# Patient Record
Sex: FEMALE | Race: BLACK OR AFRICAN AMERICAN | NOT HISPANIC OR LATINO | Employment: UNEMPLOYED | URBAN - METROPOLITAN AREA
[De-identification: names, ages, dates, MRNs, and addresses within clinical notes are randomized per-mention and may not be internally consistent; named-entity substitution may affect disease eponyms.]

---

## 2018-10-15 ENCOUNTER — OFFICE VISIT (OUTPATIENT)
Dept: FAMILY MEDICINE | Facility: CLINIC | Age: 28
End: 2018-10-15
Payer: COMMERCIAL

## 2018-10-15 VITALS
WEIGHT: 164 LBS | OXYGEN SATURATION: 99 % | SYSTOLIC BLOOD PRESSURE: 98 MMHG | HEART RATE: 78 BPM | BODY MASS INDEX: 26.36 KG/M2 | HEIGHT: 66 IN | DIASTOLIC BLOOD PRESSURE: 66 MMHG | TEMPERATURE: 98.1 F

## 2018-10-15 DIAGNOSIS — R10.13 ABDOMINAL PAIN, EPIGASTRIC: ICD-10-CM

## 2018-10-15 DIAGNOSIS — K59.00 CONSTIPATION, UNSPECIFIED CONSTIPATION TYPE: Primary | ICD-10-CM

## 2018-10-15 DIAGNOSIS — R14.0 BLOATING SYMPTOM: ICD-10-CM

## 2018-10-15 PROCEDURE — 99203 OFFICE O/P NEW LOW 30 MIN: CPT | Performed by: NURSE PRACTITIONER

## 2018-10-15 RX ORDER — POLYETHYLENE GLYCOL 3350 17 G/17G
1 POWDER, FOR SOLUTION ORAL DAILY
Qty: 510 G | Refills: 1 | Status: SHIPPED | OUTPATIENT
Start: 2018-10-15

## 2018-10-15 NOTE — MR AVS SNAPSHOT
After Visit Summary   10/15/2018    Clemencia Marquez    MRN: [id number]           Patient Information     Date Of Birth          1990        Visit Information        Provider Department      10/15/2018 2:00 PM Esperanza Fuentes APRN [facility]        Today's Diagnoses     Constipation, unspecified constipation type    -  1    Bloating symptom        Abdominal pain, epigastric          Care Instructions      Treating Constipation    Constipation is a common and often uncomfortable problem. Constipation means you have bowel movements fewer than 3 times per week, or strain to pass hard, dry stool. It can last a short time. Or it can be a problem that never seems to go away. The good news is that it can often be treated and controlled.  Eat more fiber  One of the best ways to help treat constipation is to increase your fiber intake. You can do this either through diet or by using fiber supplements. Fiber (in whole grains, fruits, and vegetables) adds bulk and absorbs water to soften the stool. This helps the stool pass through the colon more easily. When you increase your fiber intake, do it slowly to avoid side effects such as bloating. Also increase the amount of water that you drink. Eating more of the following foods can add fiber to your diet.    High-fiber cereals    Whole grains, bran, and brown rice    Vegetables such as carrots, broccoli, and greens    Fresh fruits (especially apples, pears, and dried fruits like raisins and apricots)    Nuts and legumes (especially beans such as lentils, kidney beans, and lima beans)  Get physically active  Exercise helps improve the working of your colon which helps ease constipation. Try to get some physical activity every day. If you haven t been active for a while, talk to your healthcare provider before starting again.  Laxatives  Your healthcare provider may suggest an over-the-counter product to help ease your constipation. He or she may  suggest the use of bulk-forming agents or laxatives. The use of laxatives, if used as directed, is common and safe. Follow directions carefully when using them. See your healthcare provider for new-onset constipation, or long-term constipation, to rule out other causes such as medicines or thyroid disease.  Date Last Reviewed: 7/1/2016 2000-2017 [employer]. [address] [place] [address]. All rights reserved. This information is not intended as a substitute for professional medical care. Always follow your healthcare professional's instructions.      Clemencia was seen today for abdominal pain.    Diagnoses and all orders for this visit:    Constipation, unspecified constipation type  -     polyethylene glycol (MIRALAX) powder; Take 17 g (1 capful) by mouth daily  -     Increase water intake and fruits/vegetables.  Goal:  5 fruits and vegetables every day.     Abdominal pain, epigastric  Bloating symptom  -     H Pylori antigen, stool; Future - check for bacteria in stomach.       Follow-up in 1 weeks.                   Follow-ups after your visit        Follow-up notes from your care team     Return in about 1 week (around 10/22/2018).      Future tests that were ordered for you today     Open Future Orders        Priority Expected Expires Ordered    H Pylori antigen, stool Routine  11/14/2018 10/15/2018            Who to contact     If you have questions or need follow up information about today's clinic visit or your schedule please contact [facility] [place] directly at [phone].  Normal or non-critical lab and imaging results will be communicated to you by MyChart, letter or phone within 4 business days after the clinic has received the results. If you do not hear from us within 7 days, please contact the clinic through MyChart or phone. If you have a critical or abnormal lab result, we will notify you by phone as soon as possible.  Submit refill requests through [employer] or  "call your pharmacy and they will forward the refill request to us. Please allow 3 business days for your refill to be completed.          Additional Information About Your Visit        MyChart Information     A.P.Pharmahart lets you send messages to your doctor, view your test results, renew your prescriptions, schedule appointments and more. To sign up, go to [email or web address] . Click on \"Log in\" on the left side of the screen, which will take you to the Welcome page. Then click on \"Sign up Now\" on the right side of the page.     You will be asked to enter the access code listed below, as well as some personal information. Please follow the directions to create your username and password.     Your access code is: [email or web address]  Expires: 2019  2:48 PM     Your access code will  in 90 days. If you need help or a new code, please call your [place] clinic or [phone].        Care EveryWhere ID     This is your Care EveryWhere ID. This could be used by other organizations to access your [place] medical records  [id number]        Your Vitals Were     Pulse Temperature Height Last Period Pulse Oximetry BMI (Body Mass Index)    78 98.1  F (36.7  C) (Oral) 5' 5.5\" (1.664 m) 2018 99% 26.88 kg/m2       Blood Pressure from Last 3 Encounters:   10/15/18 98/66    Weight from Last 3 Encounters:   10/15/18 164 lb (74.4 kg)                 Today's Medication Changes          These changes are accurate as of 10/15/18  2:48 PM.  If you have any questions, ask your nurse or doctor.               Start taking these medicines.        Dose/Directions    polyethylene glycol powder   Commonly known as:  MIRALAX   Used for:  Constipation, unspecified constipation type   Started by:  Esperanza Fuentes APRN CNP        Dose:  1 capful   Take 17 g (1 capful) by mouth daily   Quantity:  510 g   Refills:  1            Where to get your medicines      These medications were sent to [facility] [place], [place] - " [address] [facility] [address]  [address], [place] [address]     Phone:  [phone]     polyethylene glycol powder                Primary Care Provider Fax #    Physician No Ref-Primary [phone]       No address on file        Equal Access to Services     [facility]: Hadii aad ku hadasho Soomaali, waaxda luqadaha, qaybta kaalmada adeegyada, waxay idiin hayaan adeavery chikadanielle laasha . So [facility] [phone].    ATENCIÓN: Si habla español, tiene a alva disposición servicios gratuitos de asistencia lingüística. Sridhar al [phone].    We comply with applicable federal civil rights laws and [place] laws. We do not discriminate on the basis of race, color, national origin, age, disability, sex, sexual orientation, or gender identity.            Thank you!     Thank you for choosing [facility]  for your care. Our goal is always to provide you with excellent care. Hearing back from our patients is one way we can continue to improve our services. Please take a few minutes to complete the written survey that you may receive in the mail after your visit with us. Thank you!             Your Updated Medication List - Protect others around you: Learn how to safely use, store and throw away your medicines at www.disposemymeds.org.          This list is accurate as of 10/15/18  2:48 PM.  Always use your most recent med list.                   Brand Name Dispense Instructions for use Diagnosis    polyethylene glycol powder    MIRALAX    510 g    Take 17 g (1 capful) by mouth daily    Constipation, unspecified constipation type

## 2018-10-15 NOTE — PATIENT INSTRUCTIONS
Treating Constipation    Constipation is a common and often uncomfortable problem. Constipation means you have bowel movements fewer than 3 times per week, or strain to pass hard, dry stool. It can last a short time. Or it can be a problem that never seems to go away. The good news is that it can often be treated and controlled.  Eat more fiber  One of the best ways to help treat constipation is to increase your fiber intake. You can do this either through diet or by using fiber supplements. Fiber (in whole grains, fruits, and vegetables) adds bulk and absorbs water to soften the stool. This helps the stool pass through the colon more easily. When you increase your fiber intake, do it slowly to avoid side effects such as bloating. Also increase the amount of water that you drink. Eating more of the following foods can add fiber to your diet.    High-fiber cereals    Whole grains, bran, and brown rice    Vegetables such as carrots, broccoli, and greens    Fresh fruits (especially apples, pears, and dried fruits like raisins and apricots)    Nuts and legumes (especially beans such as lentils, kidney beans, and lima beans)  Get physically active  Exercise helps improve the working of your colon which helps ease constipation. Try to get some physical activity every day. If you haven t been active for a while, talk to your healthcare provider before starting again.  Laxatives  Your healthcare provider may suggest an over-the-counter product to help ease your constipation. He or she may suggest the use of bulk-forming agents or laxatives. The use of laxatives, if used as directed, is common and safe. Follow directions carefully when using them. See your healthcare provider for new-onset constipation, or long-term constipation, to rule out other causes such as medicines or thyroid disease.  Date Last Reviewed: 7/1/2016 2000-2017 [employer]. [address] [place] [address], [place] [address]. All rights reserved.  This information is not intended as a substitute for professional medical care. Always follow your healthcare professional's instructions.      Clemencia was seen today for abdominal pain.    Diagnoses and all orders for this visit:    Constipation, unspecified constipation type  -     polyethylene glycol (MIRALAX) powder; Take 17 g (1 capful) by mouth daily  -     Increase water intake and fruits/vegetables.  Goal:  5 fruits and vegetables every day.     Abdominal pain, epigastric  Bloating symptom  -     H Pylori antigen, stool; Future - check for bacteria in stomach.       Follow-up in 1 weeks.

## 2018-10-15 NOTE — PROGRESS NOTES
SUBJECTIVE:   Clemencia Marquez is a 28 year old female who presents to clinic today for the following health issues:      ABDOMINAL PAIN     Onset: x 3 days ago or either the week before that    Description:   Character: Fullness, like a twist in her stomach  Location: epigastric region   Radiation :lower Back    Intensity: severe    Progression of Symptoms:  waxing and waning    Accompanying Signs & Symptoms:  Fever/Chills?: no   Gas/Bloating: YES- sometimes  Nausea: no   Vomitting: no   Diarrhea?: no   Constipation:YES  Dysuria or Hematuria: no     No known history of H. Pylori.     History:   Trauma: no   Previous similar pain: YES- she said its been years that she has experienced this but it takes a long time for it to come back and it resolves on its own   Previous tests done: none    Precipitating factors:   Does the pain change with:     Food: YES- she says she can't skip a meal, the pain lessens when she eats     BM: YES- when she does a have a BM its like relief and feels better  +Constipation, has not had a bowel movement for the past one week.      Urination: no     Alleviating factors:  nothing    Therapies Tried and outcome: Has used a Himalayan sea salt for constipation     LMP:  18     Lives with sister.   Previously worked at a  center, currently looking for work,.    Moved to [place] 3 years ago from [place].    No regular primary care.     PMH:  No chronic health conditions.   No surgical history.     No regular medications.          Problem list and histories reviewed & adjusted, as indicated.  Additional history: as documented    There is no problem list on file for this patient.    No past surgical history on file.    Social History   Substance Use Topics     Smoking status: Never Smoker     Smokeless tobacco: Never Used     Alcohol use No     No family history on file.        Reviewed and updated as needed this visit by clinical staff       Reviewed and updated as needed this visit by  "Provider         ROS:  Constitutional, HEENT, cardiovascular, pulmonary, gi and gu systems are negative, except as otherwise noted.    OBJECTIVE:     BP 98/66 (BP Location: Right arm, Patient Position: Sitting, Cuff Size: Adult Regular)  Pulse 78  Temp 98.1  F (36.7  C) (Oral)  Ht 5' 5.5\" (1.664 m)  Wt 164 lb (74.4 kg)  LMP 09/30/2018  SpO2 99%  BMI 26.88 kg/m2  Body mass index is 26.88 kg/(m^2).    GENERAL: healthy, alert and no distress  RESP: lungs clear to auscultation - no rales, rhonchi or wheezes  CV: regular rate and rhythm, normal S1 S2, no S3 or S4, no murmur, click or rub  ABDOMEN: soft, nontender, no hepatosplenomegaly, no masses and bowel sounds normal  NEURO: Normal strength and tone, mentation intact and speech normal  PSYCH: mentation appears normal, affect normal/bright        ASSESSMENT/PLAN:     Clemencia was seen today for abdominal pain.    Diagnoses and all orders for this visit:    Constipation, unspecified constipation type  -     polyethylene glycol (MIRALAX) powder; Take 17 g (1 capful) by mouth daily  -     Increase water intake and fiber/fruits/vegetables.     Bloating symptom  Abdominal pain, epigastric  -     H Pylori antigen, stool; Future - rule out H. Pylori      Follow-up in 1 week for recheck of symptoms and to review result, sooner as needed.         [facility] [place]  "

## 2018-10-19 DIAGNOSIS — R14.0 BLOATING SYMPTOM: ICD-10-CM

## 2018-10-19 PROCEDURE — 87338 HPYLORI STOOL AG IA: CPT | Performed by: NURSE PRACTITIONER

## 2018-10-22 LAB
H PYLORI AG STL QL IA: ABNORMAL
SPECIMEN SOURCE: ABNORMAL

## 2018-10-23 ENCOUNTER — OFFICE VISIT (OUTPATIENT)
Dept: FAMILY MEDICINE | Facility: CLINIC | Age: 28
End: 2018-10-23
Payer: COMMERCIAL

## 2018-10-23 VITALS
WEIGHT: 164 LBS | DIASTOLIC BLOOD PRESSURE: 62 MMHG | BODY MASS INDEX: 26.36 KG/M2 | SYSTOLIC BLOOD PRESSURE: 98 MMHG | OXYGEN SATURATION: 100 % | HEART RATE: 105 BPM | HEIGHT: 66 IN | TEMPERATURE: 98 F

## 2018-10-23 DIAGNOSIS — A04.8 H. PYLORI INFECTION: Primary | ICD-10-CM

## 2018-10-23 PROCEDURE — 99213 OFFICE O/P EST LOW 20 MIN: CPT | Performed by: NURSE PRACTITIONER

## 2018-10-23 RX ORDER — CLARITHROMYCIN 500 MG
500 TABLET ORAL 2 TIMES DAILY
Qty: 28 TABLET | Refills: 0 | Status: SHIPPED | OUTPATIENT
Start: 2018-10-23 | End: 2018-11-06

## 2018-10-23 RX ORDER — AMOXICILLIN 500 MG/1
1000 CAPSULE ORAL 2 TIMES DAILY
Qty: 56 CAPSULE | Refills: 0 | Status: SHIPPED | OUTPATIENT
Start: 2018-10-23 | End: 2018-11-06

## 2018-10-23 NOTE — MR AVS SNAPSHOT
After Visit Summary   10/23/2018    Clemencia Marquez    MRN: [id number]           Patient Information     Date Of Birth          1990        Visit Information        Provider Department      10/23/2018 4:00 PM Esperanza Fuentes APRN [facility]        Today's Diagnoses     H. pylori infection    -  1      Care Instructions      Understanding H. pylori and Ulcers     An ulcer can form in two areas of the digestive tract; the stomach and the duodenum (where the stomach meets the small intestine).     Traditionally, ulcers, or sores in the lining of your digestive tract, were thought to be caused by too much spicy food, stress, or an anxious personality. We now know that most ulcers are probably due to infection with bacteria known as Helicobacter pylori (H. pylori).  Common ulcer symptoms  These include the following:    Burning, cramping, or hungerlike pain in the stomach area, often 1 to 3 hours after a meal or in the middle of the night    Pain that gets better or worse with eating    Nausea or vomiting    Black, tarry, or bloody stools (which means the ulcer is bleeding)  Or you may have no symptoms.  Your evaluation  An evaluation by your healthcare provider can show if you have an ulcer and determine whether it was caused by H. pylori. Your healthcare provider may ask you questions, examine you, and possibly do some tests. These may include:    A special X-ray called an upper gastrointestinal series, to help locate an ulcer. Before the test, you drink a chalky liquid, called barium. This liquid helps the ulcer show up on the X-ray.    An endoscopic exam, done with a long tube with a camera on the end. The tube is passed through your mouth into your stomach, and allows the healthcare provider to get a closer look at your ulcer. You will be lightly sedated for this procedure. Your healthcare provider can also take a tissue sample to test for H. pylori.    Blood, stool, and breath tests  "are also available to show whether you have H. pylori in your digestive tract.  Your treatment  To kill H. pylori so your ulcer can heal, your healthcare provider will probably prescribe antibiotics. Other ulcer medicines that help reduce stomach acid may also be prescribed as well. Testing after treatment may be recommended to be sure the H. pylori infection is gone. Usually, killing H. pylori helps keep the ulcer from returning. However, you can develop ulcers more than once in your lifetime.   Date Last Reviewed: 7/1/2016 2000-2017 [employer]. [address] [place], [place] [address]. All rights reserved. This information is not intended as a substitute for professional medical care. Always follow your healthcare professional's instructions.                Follow-ups after your visit        Follow-up notes from your care team     Return in about 2 weeks (around 11/6/2018).      Who to contact     If you have questions or need follow up information about today's clinic visit or your schedule please contact [facility] [place] directly at [phone].  Normal or non-critical lab and imaging results will be communicated to you by Wallflowerhart, letter or phone within 4 business days after the clinic has received the results. If you do not hear from us within 7 days, please contact the clinic through [employer]flower[employer] or phone. If you have a critical or abnormal lab result, we will notify you by phone as soon as possible.  Submit refill requests through [employer] or call your pharmacy and they will forward the refill request to us. Please allow 3 business days for your refill to be completed.          Additional Information About Your Visit        Wallflowerhart Information     [employer] lets you send messages to your doctor, view your test results, renew your prescriptions, schedule appointments and more. To sign up, go to [email or web address] . Click on \"Log in\" on the left side of the screen, which will take you " "to the Welcome page. Then click on \"Sign up Now\" on the right side of the page.     You will be asked to enter the access code listed below, as well as some personal information. Please follow the directions to create your username and password.     Your access code is: [email or web address]  Expires: 2019  2:48 PM     Your access code will  in 90 days. If you need help or a new code, please call your [facility] clinic or [phone].        Care EveryWhere ID     This is your Care EveryWhere ID. This could be used by other organizations to access your [facility] medical records  [id number]        Your Vitals Were     Pulse Temperature Height Last Period Pulse Oximetry BMI (Body Mass Index)    105 98  F (36.7  C) (Oral) 5' 5.5\" (1.664 m) 2018 100% 26.88 kg/m2       Blood Pressure from Last 3 Encounters:   10/23/18 98/62   10/15/18 98/66    Weight from Last 3 Encounters:   10/23/18 164 lb (74.4 kg)   10/15/18 164 lb (74.4 kg)              Today, you had the following     No orders found for display         Today's Medication Changes          These changes are accurate as of 10/23/18  4:21 PM.  If you have any questions, ask your nurse or doctor.               Start taking these medicines.        Dose/Directions    amoxicillin 500 MG capsule   Commonly known as:  AMOXIL   Used for:  H. pylori infection   Started by:  Esperanza Fuentes APRN CNP        Dose:  1000 mg   Take 2 capsules (1,000 mg) by mouth 2 times daily for 14 days   Quantity:  56 capsule   Refills:  0       clarithromycin 500 MG tablet   Commonly known as:  BIAXIN   Used for:  H. pylori infection   Started by:  Esperanza Fuentes APRN CNP        Dose:  500 mg   Take 1 tablet (500 mg) by mouth 2 times daily for 14 days   Quantity:  28 tablet   Refills:  0       omeprazole 20 MG CR capsule   Commonly known as:  priLOSEC   Used for:  H. pylori infection   Started by:  Esperanza Fuentes APRN CNP        Dose:  20 mg   Take 1 capsule (20 mg) by mouth 2 " times daily for 14 days   Quantity:  28 capsule   Refills:  0            Where to get your medicines      These medications were sent to [id number] - [place], [place] - [address] AT [place] [address]  [address], [place] [address]     Phone:  [phone]     amoxicillin 500 MG capsule    clarithromycin 500 MG tablet    omeprazole 20 MG CR capsule                Primary Care Provider Fax #    Physician No Ref-Primary [phone]       No address on file        Equal Access to Services     [facility]: Hadii aad ku hadasho Soomaali, waaxda luqadaha, qaybta kaalmada adeegyada, jen uriostegui . So [facility] [phone].    ATENCIÓN: Si habla español, tiene a alva disposición servicios gratuitos de asistencia lingüística. Llame al [phone].    We comply with applicable federal civil rights laws and [place] laws. We do not discriminate on the basis of race, color, national origin, age, disability, sex, sexual orientation, or gender identity.            Thank you!     Thank you for choosing [facility]  for your care. Our goal is always to provide you with excellent care. Hearing back from our patients is one way we can continue to improve our services. Please take a few minutes to complete the written survey that you may receive in the mail after your visit with us. Thank you!             Your Updated Medication List - Protect others around you: Learn how to safely use, store and throw away your medicines at www.disposemymeds.org.          This list is accurate as of 10/23/18  4:21 PM.  Always use your most recent med list.                   Brand Name Dispense Instructions for use Diagnosis    amoxicillin 500 MG capsule    AMOXIL    56 capsule    Take 2 capsules (1,000 mg) by mouth 2 times daily for 14 days    H. pylori infection       clarithromycin 500 MG tablet    BIAXIN    28 tablet    Take 1 tablet (500 mg) by mouth 2 times daily for 14 days     H. pylori infection       omeprazole 20 MG CR capsule    priLOSEC    28 capsule    Take 1 capsule (20 mg) by mouth 2 times daily for 14 days    H. pylori infection       polyethylene glycol powder    MIRALAX    510 g    Take 17 g (1 capful) by mouth daily    Constipation, unspecified constipation type

## 2018-10-23 NOTE — PATIENT INSTRUCTIONS
Understanding H. pylori and Ulcers     An ulcer can form in two areas of the digestive tract; the stomach and the duodenum (where the stomach meets the small intestine).     Traditionally, ulcers, or sores in the lining of your digestive tract, were thought to be caused by too much spicy food, stress, or an anxious personality. We now know that most ulcers are probably due to infection with bacteria known as Helicobacter pylori (H. pylori).  Common ulcer symptoms  These include the following:    Burning, cramping, or hungerlike pain in the stomach area, often 1 to 3 hours after a meal or in the middle of the night    Pain that gets better or worse with eating    Nausea or vomiting    Black, tarry, or bloody stools (which means the ulcer is bleeding)  Or you may have no symptoms.  Your evaluation  An evaluation by your healthcare provider can show if you have an ulcer and determine whether it was caused by H. pylori. Your healthcare provider may ask you questions, examine you, and possibly do some tests. These may include:    A special X-ray called an upper gastrointestinal series, to help locate an ulcer. Before the test, you drink a chalky liquid, called barium. This liquid helps the ulcer show up on the X-ray.    An endoscopic exam, done with a long tube with a camera on the end. The tube is passed through your mouth into your stomach, and allows the healthcare provider to get a closer look at your ulcer. You will be lightly sedated for this procedure. Your healthcare provider can also take a tissue sample to test for H. pylori.    Blood, stool, and breath tests are also available to show whether you have H. pylori in your digestive tract.  Your treatment  To kill H. pylori so your ulcer can heal, your healthcare provider will probably prescribe antibiotics. Other ulcer medicines that help reduce stomach acid may also be prescribed as well. Testing after treatment may be recommended to be sure the H. pylori  infection is gone. Usually, killing H. pylori helps keep the ulcer from returning. However, you can develop ulcers more than once in your lifetime.   Date Last Reviewed: 7/1/2016 2000-2017 [employer]. [address] [place] [address], [place] [address]. All rights reserved. This information is not intended as a substitute for professional medical care. Always follow your healthcare professional's instructions.

## 2018-10-23 NOTE — PROGRESS NOTES
"  SUBJECTIVE:   Clemencia Marquez is a 28 year old female who presents to clinic today for the following health issues:      Recheck from 10/15/18- constipation, abdominal pain.   Miralax is helping, not as much constipation.  Review labs from last visit.            Problem list and histories reviewed & adjusted, as indicated.  Additional history: as documented    There is no problem list on file for this patient.    No past surgical history on file.    Social History   Substance Use Topics     Smoking status: Never Smoker     Smokeless tobacco: Never Used     Alcohol use No     No family history on file.        Reviewed and updated as needed this visit by clinical staff       Reviewed and updated as needed this visit by Provider         ROS:  CONSTITUTIONAL: NEGATIVE for fever, chills, change in weight  RESP: NEGATIVE for significant cough or SOB  CV: NEGATIVE for chest pain, palpitations or peripheral edema  GI: NEGATIVE for nausea, abdominal pain, heartburn, or change in bowel habits    OBJECTIVE:     BP 98/62 (BP Location: Right arm, Patient Position: Sitting, Cuff Size: Adult Regular)  Pulse 105  Temp 98  F (36.7  C) (Oral)  Ht 5' 5.5\" (1.664 m)  Wt 164 lb (74.4 kg)  LMP 09/30/2018  SpO2 100%  BMI 26.88 kg/m2  Body mass index is 26.88 kg/(m^2).    GENERAL: healthy, alert and no distress  RESP: lungs clear to auscultation - no rales, rhonchi or wheezes  CV: regular rate and rhythm, normal S1 S2, no S3 or S4, no murmur, click or rub, no peripheral edema  ABDOMEN: soft, +epigastric region with ttp,  bowel sounds normal    ASSESSMENT/PLAN:     Clemencia was seen today for recheck.    Diagnoses and all orders for this visit:  Feces   H Pylori Antigen (Abnormal) 10/19/2018 12:40 PM 75   Positive for Helicobacter pylori antigen by enzyme immunoassay. A positive result   indicates the presence of H. pylori antigen       H. pylori infection  -     clarithromycin (BIAXIN) 500 MG tablet; Take 1 tablet (500 mg) by mouth 2 " times daily for 14 days  -     amoxicillin (AMOXIL) 500 MG capsule; Take 2 capsules (1,000 mg) by mouth 2 times daily for 14 days  -     omeprazole (PRILOSEC) 20 MG CR capsule; Take 1 capsule (20 mg) by mouth 2 times daily for 14 days    Follow-up in 2 weeks, sooner as needed.       [facility]AGE

## 2018-12-13 ENCOUNTER — OFFICE VISIT (OUTPATIENT)
Dept: FAMILY MEDICINE | Facility: CLINIC | Age: 28
End: 2018-12-13
Payer: COMMERCIAL

## 2018-12-13 VITALS
BODY MASS INDEX: 25.24 KG/M2 | HEART RATE: 103 BPM | TEMPERATURE: 98.1 F | SYSTOLIC BLOOD PRESSURE: 102 MMHG | WEIGHT: 154 LBS | DIASTOLIC BLOOD PRESSURE: 64 MMHG | OXYGEN SATURATION: 100 %

## 2018-12-13 DIAGNOSIS — H01.006 BLEPHARITIS OF BOTH EYES, UNSPECIFIED EYELID, UNSPECIFIED TYPE: Primary | ICD-10-CM

## 2018-12-13 DIAGNOSIS — H01.003 BLEPHARITIS OF BOTH EYES, UNSPECIFIED EYELID, UNSPECIFIED TYPE: Primary | ICD-10-CM

## 2018-12-13 PROCEDURE — 99213 OFFICE O/P EST LOW 20 MIN: CPT | Performed by: FAMILY MEDICINE

## 2018-12-13 NOTE — LETTER
December 13, 2018      Clemencia Marquez  [address] [address]        To Whom It May Concern:    Clemencia Marquez was seen in our clinic. Please excuse her from work for time missed due to her appointment. She may return to work without restrictions.      Sincerely,        Zan Shelby, DO

## 2018-12-13 NOTE — LETTER
December 13, 2018      Clemencia Marquez  [address] [address]        To Whom It May Concern:    Clemencia Marquez was seen in our clinic. Please excuse her for time missed due to appointment. Currently having sensitivity to light. I expect this will be temporary and after treatment for underlying condition, will resolve. For now, would recommend avoiding bright lights or other sources of eye irritation.      Sincerely,        Zan Shelby, DO

## 2018-12-13 NOTE — PATIENT INSTRUCTIONS
Start washing lids gently with baby shampoo. Can also do warm compresses on eye lids. Use over the counter lubricating eye drops.     Blepharitis    Blepharitis is an inflammation of the eyelid. It results in swelling of the eyelids, and it is often caused by a bacterial infection or a skin condition. Blepharitis is a common eye condition. There are two types. Anterior blepharitis occurs where the eyelashes are attached (outside front edge of the eyelid). Posterior blepharitis affects the inner edge of the eyelid that touches the eyeball.  In addition to swollen eyelids, blepharitis symptoms can include thick, yellow, dandruff-like scales that stick to the eyelid. There may be oily patches on the eyelid. The eyelashes may be crusted (with dandruff-like scales) when you wake up from sleeping. The irritated area may itch. The eyelids may be red. The eyes can be red and burn or sting. The eyes may tear a lot, or be dry. You can become sensitive to light or have blurred vision. Symptoms of blepharitis can cause irritability.  Blepharitis is a chronic condition and hard to cure. Even with successful treatment, recurrences are common. Good hygiene and home treatments (in the Home care section below) can improve your condition.  Causes  Causes of blepharitis may include:    Problems with the oil glands in the eyelid (meibomian glands)    Dandruff of the scalp and eyebrows (seborrheic dermatitis)    Acne rosacea (a skin condition that causes redness of the face, and other symptoms)    Eyelash mites (tiny organisms in the eyelash follicles)    Allergic reactions to cosmetics or medicines  Home care  Medicine: The healthcare provider may prescribe an antibiotic eye drops or ointment, artificial tears, and/or steroid eye drops. Follow all instructions for using these medicines. Use all medicines as directed. If you have pain, take medicine as advised by the healthcare provider.    Wash your hands carefully with soap and warm  water before and after caring for your eyes.    Apply a warm compress or a warm, moist washcloth to the eyelids for 1 minute, 2 to 3 times a day, to loosen the crust. Then, wipe away scales or crust from the eyelids.    After applying the warm compress, gently scrub the base of the eyelashes for almost 15 seconds per eyelid. To do this, close your eyes and use a moist eyelid cleansing wipe, clean washcloth, or cotton swab. Ask your healthcare provider about products (such as gentle baby shampoo) to use to help clean the eyelids.    You may be instructed to gently massage your eyelids to help unblock the eyelid glands. Follow all instructions given by the healthcare provider.    Unless told otherwise, on a regular basis, with eyes closed, clean your eyelids as directed by the healthcare provider. Blepharitis can be an ongoing problem.    Don't wear eye makeup until the inflammation goes away, or as directed by your healthcare provider.    Unless told otherwise, stop using contact lenses until you complete treatment for the condition.    Wash your hands regularly to help prevent dirt and bacteria from coming in contact with your eyelid.  Follow-up care  Follow up with your healthcare provider, or as advised. Your healthcare provider may refer you to an eye specialist (an optometrist or ophthalmologist) for further evaluation and treatment.  When to seek medical advice  Call your healthcare provider right away if any of these occur:    Increase in redness of the white part of the eye    Increase in swelling, redness, irritation, or pain of the eyelids    Eye pain    Change in vision (trouble seeing or blurring)    Drainage (pus, blood) from the eyelid    Fever of 100.4 F (38 C) or higher, or as directed by your healthcare provider  Date Last Reviewed: 3/1/2018    [id number] [employer]. [address] [place] [address], [place] [address]. All rights reserved. This information is not intended as a substitute for  professional medical care. Always follow your healthcare professional's instructions.          If symptoms still are not improving, recommend following up with your eye doctor. If having more spots in vision or any vision changes, recommend seeing eye doctor immediately.

## 2018-12-13 NOTE — PROGRESS NOTES
SUBJECTIVE:                                                    Clemencai Marquez is a 28 year old female who presents to clinic today for the following health issues:      Eye(s) Problem  Onset: 1 month    Description:   Location: bilateral  Pain: YES - itching  Redness: YES    Accompanying Signs & Symptoms:  Discharge/mattering: no  Swelling: YES  Visual changes: no  Fever: no  Nasal Congestion: no  Bothered by bright lights: YES  Denies any headaches, lightheadedness, dizziness.    History:   Trauma: no   Foreign body exposure: no    Precipitating factors:   Wearing contacts: no    Alleviating factors:  Improved by: none    Therapies Tried and outcome: none    Eyes are sensitive to light- works with a bright machine at work, would like a note    She does wear glasses and hasn't had change in prescription     Problem list and histories reviewed & adjusted, as indicated.  Additional history: as documented    Patient Active Problem List   Diagnosis     H. pylori infection     History reviewed. No pertinent surgical history.    Social History     Tobacco Use     Smoking status: Never Smoker     Smokeless tobacco: Never Used   Substance Use Topics     Alcohol use: No     History reviewed. No pertinent family history.        ROS:  Constitutional, HEENT, cardiovascular, pulmonary, gi and gu systems are negative, except as otherwise noted.    OBJECTIVE:     /64   Pulse 103   Temp 98.1  F (36.7  C) (Oral)   Wt 69.9 kg (154 lb)   SpO2 100%   BMI 25.24 kg/m    Body mass index is 25.24 kg/m .  GENERAL: healthy, alert and no distress  EYES: Eyes grossly normal to inspection, PERRL and conjunctivae and sclerae normal, dryness and meibomian glands visible with some redness    Diagnostic Test Results:  none     ASSESSMENT/PLAN:   1. Blepharitis of both eyes, unspecified eyelid, unspecified type: discussed symptomatic cares, gentle washing of eyelids with baby shampoo and warm water. Can also try warm compresses. Follow up  if not improving.    Zan Shelby, DO  [facility]

## 2019-10-31 DIAGNOSIS — L65.0 TELOGEN EFFLUVIUM: Primary | ICD-10-CM

## 2019-10-31 DIAGNOSIS — L65.0 TELOGEN EFFLUVIUM: ICD-10-CM

## 2019-10-31 PROCEDURE — 83540 ASSAY OF IRON: CPT

## 2019-10-31 PROCEDURE — 36415 COLL VENOUS BLD VENIPUNCTURE: CPT

## 2019-10-31 PROCEDURE — 82627 DEHYDROEPIANDROSTERONE: CPT

## 2019-10-31 PROCEDURE — 83550 IRON BINDING TEST: CPT

## 2019-10-31 PROCEDURE — 84270 ASSAY OF SEX HORMONE GLOBUL: CPT

## 2019-10-31 PROCEDURE — 84403 ASSAY OF TOTAL TESTOSTERONE: CPT

## 2019-10-31 PROCEDURE — 84443 ASSAY THYROID STIM HORMONE: CPT

## 2019-10-31 PROCEDURE — 82728 ASSAY OF FERRITIN: CPT

## 2019-11-01 LAB
DHEA-S SERPL-MCNC: 78 UG/DL (ref 35–430)
FERRITIN SERPL-MCNC: 16 NG/ML (ref 12–150)
IRON SATN MFR SERPL: 7 % (ref 15–46)
IRON SERPL-MCNC: 21 UG/DL (ref 35–180)
TIBC SERPL-MCNC: 305 UG/DL (ref 240–430)
TSH SERPL DL<=0.005 MIU/L-ACNC: 0.87 MU/L (ref 0.4–4)

## 2019-11-05 LAB
SHBG SERPL-SCNC: 63 NMOL/L (ref 30–135)
TESTOST FREE SERPL-MCNC: 0.15 NG/DL (ref 0.13–0.92)
TESTOST SERPL-MCNC: 14 NG/DL (ref 8–60)

## 2019-11-11 ENCOUNTER — MEDICAL CORRESPONDENCE (OUTPATIENT)
Dept: HEALTH INFORMATION MANAGEMENT | Facility: CLINIC | Age: 29
End: 2019-11-11

## 2021-08-01 ENCOUNTER — HOSPITAL ENCOUNTER (EMERGENCY)
Facility: CLINIC | Age: 31
Discharge: HOME | End: 2021-08-01
Attending: NURSE PRACTITIONER | Admitting: NURSE PRACTITIONER
Payer: COMMERCIAL

## 2021-08-01 VITALS
WEIGHT: 172 LBS | OXYGEN SATURATION: 99 % | RESPIRATION RATE: 16 BRPM | SYSTOLIC BLOOD PRESSURE: 111 MMHG | TEMPERATURE: 98.6 F | HEIGHT: 65 IN | BODY MASS INDEX: 28.66 KG/M2 | DIASTOLIC BLOOD PRESSURE: 62 MMHG | HEART RATE: 86 BPM

## 2021-08-01 DIAGNOSIS — S16.1XXA STRAIN OF NECK MUSCLE, INITIAL ENCOUNTER: ICD-10-CM
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
## 2021-08-01 PROCEDURE — 99283 EMERGENCY DEPT VISIT LOW MDM: CPT

## 2021-08-01 RX ORDER — CYCLOBENZAPRINE HCL 10 MG
10 TABLET ORAL 3 TIMES DAILY PRN
Qty: 20 TABLET | Refills: 0 | Status: SHIPPED | OUTPATIENT
Start: 2021-08-01 | End: 2021-08-08

## 2021-08-01 ASSESSMENT — ENCOUNTER SYMPTOMS
NUMBNESS: 0
SHORTNESS OF BREATH: 0
FEVER: 0
ABDOMINAL PAIN: 0
NECK PAIN: 1
HEADACHES: 0
DIARRHEA: 0
CONSTIPATION: 0
BACK PAIN: 1

## 2021-08-01 ASSESSMENT — MIFFLIN-ST. JEOR: SCORE: 1486.07
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
## 2021-08-14 ENCOUNTER — HEALTH MAINTENANCE LETTER (OUTPATIENT)
Age: 31
End: 2021-08-14

## 2021-10-10 ENCOUNTER — HEALTH MAINTENANCE LETTER (OUTPATIENT)
Age: 31
End: 2021-10-10

## 2022-09-18 ENCOUNTER — HEALTH MAINTENANCE LETTER (OUTPATIENT)
Age: 32
End: 2022-09-18

## 2023-10-08 ENCOUNTER — HEALTH MAINTENANCE LETTER (OUTPATIENT)
Age: 33
End: 2023-10-08

## 2024-09-20 ENCOUNTER — APPOINTMENT (OUTPATIENT)
Dept: ULTRASOUND IMAGING | Facility: CLINIC | Age: 34
End: 2024-09-20
Attending: EMERGENCY MEDICINE

## 2024-09-20 ENCOUNTER — HOSPITAL ENCOUNTER (EMERGENCY)
Facility: CLINIC | Age: 34
Discharge: HOME | End: 2024-09-20
Attending: EMERGENCY MEDICINE | Admitting: EMERGENCY MEDICINE

## 2024-09-20 VITALS
OXYGEN SATURATION: 96 % | BODY MASS INDEX: 28.61 KG/M2 | TEMPERATURE: 97.9 F | RESPIRATION RATE: 18 BRPM | HEIGHT: 65 IN | SYSTOLIC BLOOD PRESSURE: 130 MMHG | WEIGHT: 171.74 LBS | DIASTOLIC BLOOD PRESSURE: 88 MMHG | HEART RATE: 87 BPM

## 2024-09-20 DIAGNOSIS — R10.2 PELVIC PAIN IN FEMALE: ICD-10-CM

## 2024-09-20 LAB
ALBUMIN UR-MCNC: 70 MG/DL
APPEARANCE UR: ABNORMAL
BACTERIA #/AREA URNS HPF: ABNORMAL /HPF
BILIRUB UR QL STRIP: NEGATIVE
COLOR UR AUTO: YELLOW
GLUCOSE UR STRIP-MCNC: NEGATIVE MG/DL
HCG UR QL: NEGATIVE
HGB UR QL STRIP: ABNORMAL
KETONES UR STRIP-MCNC: NEGATIVE MG/DL
LEUKOCYTE ESTERASE UR QL STRIP: ABNORMAL
MUCOUS THREADS #/AREA URNS LPF: PRESENT /LPF
NITRATE UR QL: NEGATIVE
PH UR STRIP: 8.5 [PH] (ref 5–7)
RBC URINE: >182 /HPF
SP GR UR STRIP: 1.02 (ref 1–1.03)
SQUAMOUS EPITHELIAL: 1 /HPF
UROBILINOGEN UR STRIP-MCNC: NORMAL MG/DL
WBC URINE: 30 /HPF

## 2024-09-20 PROCEDURE — 99285 EMERGENCY DEPT VISIT HI MDM: CPT | Mod: 25

## 2024-09-20 PROCEDURE — 250N000013 HC RX MED GY IP 250 OP 250 PS 637: Performed by: EMERGENCY MEDICINE

## 2024-09-20 PROCEDURE — 81003 URINALYSIS AUTO W/O SCOPE: CPT | Performed by: EMERGENCY MEDICINE

## 2024-09-20 PROCEDURE — 76830 TRANSVAGINAL US NON-OB: CPT

## 2024-09-20 PROCEDURE — 81025 URINE PREGNANCY TEST: CPT | Performed by: EMERGENCY MEDICINE

## 2024-09-20 PROCEDURE — 87086 URINE CULTURE/COLONY COUNT: CPT | Performed by: EMERGENCY MEDICINE

## 2024-09-20 RX ORDER — ONDANSETRON 2 MG/ML
4 INJECTION INTRAMUSCULAR; INTRAVENOUS ONCE
Status: DISCONTINUED | OUTPATIENT
Start: 2024-09-20 | End: 2024-09-20 | Stop reason: HOSPADM

## 2024-09-20 RX ORDER — ONDANSETRON 4 MG/1
4 TABLET, ORALLY DISINTEGRATING ORAL EVERY 6 HOURS PRN
Qty: 10 TABLET | Refills: 0 | Status: SHIPPED | OUTPATIENT
Start: 2024-09-20 | End: 2024-09-23

## 2024-09-20 RX ORDER — IBUPROFEN 600 MG/1
600 TABLET, FILM COATED ORAL ONCE
Status: COMPLETED | OUTPATIENT
Start: 2024-09-20 | End: 2024-09-20

## 2024-09-20 RX ADMIN — IBUPROFEN 600 MG: 600 TABLET ORAL at 11:54

## 2024-09-20 ASSESSMENT — ACTIVITIES OF DAILY LIVING (ADL)
ADLS_ACUITY_SCORE: 33

## 2024-09-20 ASSESSMENT — COLUMBIA-SUICIDE SEVERITY RATING SCALE - C-SSRS
2. HAVE YOU ACTUALLY HAD ANY THOUGHTS OF KILLING YOURSELF IN THE PAST MONTH?: NO
6. HAVE YOU EVER DONE ANYTHING, STARTED TO DO ANYTHING, OR PREPARED TO DO ANYTHING TO END YOUR LIFE?: NO
1. IN THE PAST MONTH, HAVE YOU WISHED YOU WERE DEAD OR WISHED YOU COULD GO TO SLEEP AND NOT WAKE UP?: NO

## 2024-09-20 NOTE — ED TRIAGE NOTES
Pt presents to the ED stating she woke up today and vomited. Pt states she has lower abd cramping 10/10 and is on her period.

## 2024-09-20 NOTE — ED PROVIDER NOTES
"  Emergency Department Note      History of Present Illness     Chief Complaint  Abdominal Pain    HPI  Clemencia Marquez is a 34 year old female who presents to the ED for evaluation of abdominal pain. She reports vomiting and severe lower abdominal cramping since this morning. No abdominal surgeries. Her period began today and normally gets heavy bleeding. No history of pregnancy. No use of pain medications.     Independent Historian  None    Review of External Notes  None  Past Medical History   Medical History and Problem List  H.pylori infection  Severe anemia  Hypokalemia  UTI    Medications  The patient is not currently taking any prescribed medications.    Surgical History   Breast reduction   Physical Exam   Patient Vitals for the past 24 hrs:   BP Temp Temp src Pulse Resp SpO2 Height Weight   09/20/24 1114 130/88 97.9  F (36.6  C) Oral 87 18 96 % 1.651 m (5' 5\") 77.9 kg (171 lb 11.8 oz)     Physical Exam  Vitals reviewed.   Cardiovascular:      Rate and Rhythm: Normal rate.   Pulmonary:      Effort: Pulmonary effort is normal.   Abdominal:      Palpations: Abdomen is soft.      Tenderness: There is abdominal tenderness in the right lower quadrant, suprapubic area and left lower quadrant.      Hernia: No hernia is present.   Skin:     Capillary Refill: Capillary refill takes less than 2 seconds.   Neurological:      General: No focal deficit present.      Mental Status: She is alert.   Psychiatric:         Mood and Affect: Mood normal.         Diagnostics   Lab Results   Labs Ordered and Resulted from Time of ED Arrival to Time of ED Departure   ROUTINE UA WITH MICROSCOPIC REFLEX TO CULTURE - Abnormal       Result Value    Color Urine Yellow      Appearance Urine Cloudy (*)     Glucose Urine Negative      Bilirubin Urine Negative      Ketones Urine Negative      Specific Gravity Urine 1.023      Blood Urine Large (*)     pH Urine 8.5 (*)     Protein Albumin Urine 70 (*)     Urobilinogen Urine Normal      Nitrite " Urine Negative      Leukocyte Esterase Urine Moderate (*)     Bacteria Urine Few (*)     Mucus Urine Present (*)     RBC Urine >182 (*)     WBC Urine 30 (*)     Squamous Epithelials Urine 1     HCG QUALITATIVE URINE - Normal    hCG Urine Qualitative Negative     URINE CULTURE     Imaging  US Pelvic Complete with Transvaginal   Final Result   IMPRESSION:   1.  Unremarkable pelvic ultrasound without definite visualized   explanation for patient's symptoms.      LEVY BEAL MD            SYSTEM ID:  [id number]        Report per radiology    Independent Interpretation  None  ED Course    Medications Administered  Medications   ondansetron (ZOFRAN) injection 4 mg (has no administration in time range)   ibuprofen (ADVIL/MOTRIN) tablet 600 mg (600 mg Oral $Given 9/20/24 1154)     Procedures  Procedures     Discussion of Management  None    Optional/Additional Documentation  None    ED Course  ED Course as of 09/20/24 1445   Fri Sep 20, 2024   1129 I obtained history and examined the patient as noted above.    1442 I rechecked the patient and explained findings. Patient has no urinary symptoms. I prepared the patient to be discharged home.      Medical Decision Making / Diagnosis   CMS Diagnoses: None    MIPS     None    MDM  Patient presents with lower pelvic pain associated with menses.  Did vomit once.  Well-appearing vitals are stable.  Lab work unremarkable due to severe pelvic pain and during her menses ultrasound performed without signs of ovarian cyst or torsion.  Patient offered reassurance and follow-up with GYN.  Patient offered antiemetics and discharged home in stable condition.  Urinalysis positive for blood but likely related to menses.  30 white blood cells seen but likely contaminated no urinary symptoms and therefore antibiotics were not prescribed.    Disposition  The patient was discharged.     ICD-10 Codes:    ICD-10-CM    1. Pelvic pain in female  R10.2          Discharge Medications  Discharge  Medication List as of 9/20/2024  2:47 PM        START taking these medications    Details   ondansetron (ZOFRAN ODT) 4 MG ODT tab Take 1 tablet (4 mg) by mouth every 6 hours as needed for nausea or vomiting., Disp-10 tablet, R-0, Local Print           Scribe Disclosure:  MIKAYLA, Radha Lincoln, am serving as a scribe at 11:32 AM on 9/20/2024 to document services personally performed by Jamie George MD based on my observations and the provider's statements to me.      Jamie George MD  09/25/24 1935

## 2024-09-20 NOTE — DISCHARGE INSTRUCTIONS
We have done a ultrasound that shows no structural abnormality in the uterus or ovaries.  Pelvic pain during menses can be multifactorial.  We recommend you see a gynecologist for further assessment.  As we have discussed your urine showed a number of white blood cells but with out urinary symptoms we are going to culture the urine if it grows out a bacteria that requires treatment we will call you.  Please use nausea medication when needed.  Okay to use ibuprofen or Tylenol for pain.  Return with fever pain that is severe or unrelenting after medications or persistent vomiting and unable to tolerate fluids.  Thanks for your patience today.

## 2024-09-21 LAB — BACTERIA UR CULT: NORMAL

## 2024-12-01 ENCOUNTER — HEALTH MAINTENANCE LETTER (OUTPATIENT)
Age: 34
End: 2024-12-01

## 2025-01-19 ENCOUNTER — HOSPITAL ENCOUNTER (EMERGENCY)
Facility: CLINIC | Age: 35
Discharge: HOME | End: 2025-01-19
Attending: EMERGENCY MEDICINE | Admitting: EMERGENCY MEDICINE
Payer: MEDICAID

## 2025-01-19 VITALS
SYSTOLIC BLOOD PRESSURE: 129 MMHG | TEMPERATURE: 98.3 F | WEIGHT: 166.89 LBS | BODY MASS INDEX: 27.77 KG/M2 | RESPIRATION RATE: 16 BRPM | DIASTOLIC BLOOD PRESSURE: 89 MMHG | OXYGEN SATURATION: 100 % | HEART RATE: 82 BPM

## 2025-01-19 DIAGNOSIS — N94.6 DYSMENORRHEA: ICD-10-CM

## 2025-01-19 LAB
ALBUMIN UR-MCNC: 30 MG/DL
ANION GAP SERPL CALCULATED.3IONS-SCNC: 13 MMOL/L (ref 7–15)
APPEARANCE UR: ABNORMAL
BASOPHILS # BLD AUTO: 0 10E3/UL (ref 0–0.2)
BASOPHILS NFR BLD AUTO: 0 %
BILIRUB UR QL STRIP: NEGATIVE
BUN SERPL-MCNC: 7 MG/DL (ref 6–20)
CALCIUM SERPL-MCNC: 8.9 MG/DL (ref 8.8–10.4)
CHLORIDE SERPL-SCNC: 101 MMOL/L (ref 98–107)
COLOR UR AUTO: YELLOW
CREAT SERPL-MCNC: 0.52 MG/DL (ref 0.51–0.95)
EGFRCR SERPLBLD CKD-EPI 2021: >90 ML/MIN/1.73M2
EOSINOPHIL # BLD AUTO: 0 10E3/UL (ref 0–0.7)
EOSINOPHIL NFR BLD AUTO: 0 %
ERYTHROCYTE [DISTWIDTH] IN BLOOD BY AUTOMATED COUNT: 13 % (ref 10–15)
GLUCOSE SERPL-MCNC: 97 MG/DL (ref 70–99)
GLUCOSE UR STRIP-MCNC: NEGATIVE MG/DL
HCG SERPL QL: NEGATIVE
HCO3 SERPL-SCNC: 23 MMOL/L (ref 22–29)
HCT VFR BLD AUTO: 38.7 % (ref 35–47)
HGB BLD-MCNC: 12.6 G/DL (ref 11.7–15.7)
HGB UR QL STRIP: ABNORMAL
IMM GRANULOCYTES # BLD: 0 10E3/UL
IMM GRANULOCYTES NFR BLD: 1 %
KETONES UR STRIP-MCNC: ABNORMAL MG/DL
LEUKOCYTE ESTERASE UR QL STRIP: ABNORMAL
LYMPHOCYTES # BLD AUTO: 1.2 10E3/UL (ref 0.8–5.3)
LYMPHOCYTES NFR BLD AUTO: 18 %
MCH RBC QN AUTO: 28.4 PG (ref 26.5–33)
MCHC RBC AUTO-ENTMCNC: 32.6 G/DL (ref 31.5–36.5)
MCV RBC AUTO: 87 FL (ref 78–100)
MONOCYTES # BLD AUTO: 0.3 10E3/UL (ref 0–1.3)
MONOCYTES NFR BLD AUTO: 4 %
MUCOUS THREADS #/AREA URNS LPF: PRESENT /LPF
NEUTROPHILS # BLD AUTO: 5.1 10E3/UL (ref 1.6–8.3)
NEUTROPHILS NFR BLD AUTO: 77 %
NITRATE UR QL: NEGATIVE
NRBC # BLD AUTO: 0 10E3/UL
NRBC BLD AUTO-RTO: 0 /100
PH UR STRIP: 6.5 [PH] (ref 5–7)
PLATELET # BLD AUTO: 324 10E3/UL (ref 150–450)
POTASSIUM SERPL-SCNC: 3.7 MMOL/L (ref 3.4–5.3)
RBC # BLD AUTO: 4.44 10E6/UL (ref 3.8–5.2)
RBC URINE: >182 /HPF
SODIUM SERPL-SCNC: 137 MMOL/L (ref 135–145)
SP GR UR STRIP: 1.02 (ref 1–1.03)
SQUAMOUS EPITHELIAL: 1 /HPF
UROBILINOGEN UR STRIP-MCNC: NORMAL MG/DL
WBC # BLD AUTO: 6.6 10E3/UL (ref 4–11)
WBC URINE: 8 /HPF

## 2025-01-19 PROCEDURE — 80048 BASIC METABOLIC PNL TOTAL CA: CPT | Performed by: EMERGENCY MEDICINE

## 2025-01-19 PROCEDURE — 250N000011 HC RX IP 250 OP 636: Performed by: EMERGENCY MEDICINE

## 2025-01-19 PROCEDURE — 81001 URINALYSIS AUTO W/SCOPE: CPT | Performed by: EMERGENCY MEDICINE

## 2025-01-19 PROCEDURE — 99284 EMERGENCY DEPT VISIT MOD MDM: CPT | Mod: 25 | Performed by: EMERGENCY MEDICINE

## 2025-01-19 PROCEDURE — 85041 AUTOMATED RBC COUNT: CPT | Performed by: EMERGENCY MEDICINE

## 2025-01-19 PROCEDURE — 84703 CHORIONIC GONADOTROPIN ASSAY: CPT | Performed by: EMERGENCY MEDICINE

## 2025-01-19 PROCEDURE — 96375 TX/PRO/DX INJ NEW DRUG ADDON: CPT | Performed by: EMERGENCY MEDICINE

## 2025-01-19 PROCEDURE — 96374 THER/PROPH/DIAG INJ IV PUSH: CPT | Performed by: EMERGENCY MEDICINE

## 2025-01-19 PROCEDURE — 36415 COLL VENOUS BLD VENIPUNCTURE: CPT | Performed by: EMERGENCY MEDICINE

## 2025-01-19 PROCEDURE — 85004 AUTOMATED DIFF WBC COUNT: CPT | Performed by: EMERGENCY MEDICINE

## 2025-01-19 RX ORDER — ONDANSETRON 2 MG/ML
4 INJECTION INTRAMUSCULAR; INTRAVENOUS EVERY 30 MIN PRN
Status: DISCONTINUED | OUTPATIENT
Start: 2025-01-19 | End: 2025-01-19 | Stop reason: HOSPADM

## 2025-01-19 RX ORDER — KETOROLAC TROMETHAMINE 15 MG/ML
10 INJECTION, SOLUTION INTRAMUSCULAR; INTRAVENOUS ONCE
Status: COMPLETED | OUTPATIENT
Start: 2025-01-19 | End: 2025-01-19

## 2025-01-19 RX ADMIN — ONDANSETRON 4 MG: 2 INJECTION INTRAMUSCULAR; INTRAVENOUS at 13:10

## 2025-01-19 RX ADMIN — KETOROLAC TROMETHAMINE 10 MG: 15 INJECTION, SOLUTION INTRAMUSCULAR; INTRAVENOUS at 13:11

## 2025-01-19 ASSESSMENT — ACTIVITIES OF DAILY LIVING (ADL)
ADLS_ACUITY_SCORE: 41

## 2025-01-19 ASSESSMENT — COLUMBIA-SUICIDE SEVERITY RATING SCALE - C-SSRS
6. HAVE YOU EVER DONE ANYTHING, STARTED TO DO ANYTHING, OR PREPARED TO DO ANYTHING TO END YOUR LIFE?: NO
2. HAVE YOU ACTUALLY HAD ANY THOUGHTS OF KILLING YOURSELF IN THE PAST MONTH?: NO
1. IN THE PAST MONTH, HAVE YOU WISHED YOU WERE DEAD OR WISHED YOU COULD GO TO SLEEP AND NOT WAKE UP?: NO

## 2025-01-19 NOTE — ED PROVIDER NOTES
Emergency Department Note      History of Present Illness     Chief Complaint   Vomiting and Abdominal Pain      HPI   Clemencia Marquez is a 35 year old female with history of H.Pylori and anemia who presents to the emergency department today for evaluation of vomiting and abdominal pain. The patient reports feeling nauseous and vomiting once today. This was accompanied by a constant lower abdominal cramping. She felt fine yesterday and developed symptoms while she was driving around today. Clemencia reports her initial pain as a 10/10 in severity. Her pain was slightly relieved after vomiting, and is now an 8/10 in severity. Last night, Clemencia couldn't sleep due to feeling warm, and is feeling constipated today. She reports a similar previous episode, but couldn't recall a date. Clemencia is unsure if the pain is different than her typical menstrual cramping, and reports her last menstrual cycle was 12/20/24. She did not take any medications for the abdominal pain prior to arrival at the ED. Clemencia denies any dysuria or urinary frequency, fevers, cough, sore throat, rhinorrhea, or known sick contacts. vaginal bleeding or discharge, missed periods, chance of pregnancy. Clemencia is not nauseous now in the ED.    Independent Historian   None    Review of External Notes   I reviewed ER visit from 9/20/2024 when the patient was seen for pelvic pain.    Past Medical History     Medical History and Problem List   Anemia  Hypokalemia  H. Pylori infection  UTI    Medications   The patient is currently on no regular medications.     Surgical History   Breast reduction     Physical Exam     Patient Vitals for the past 24 hrs:   BP Temp Temp src Pulse Resp SpO2 Weight   01/19/25 1531 -- -- -- -- 16 -- --   01/19/25 1213 129/89 98.3  F (36.8  C) Oral 82 16 100 % 75.7 kg (166 lb 14.2 oz)     Physical Exam  Vitals and nursing note reviewed.   Constitutional:       General: She is not in acute distress.     Appearance: She is not ill-appearing.    HENT:      Head: Normocephalic and atraumatic.      Right Ear: External ear normal.      Left Ear: External ear normal.      Nose: Nose normal.      Mouth/Throat:      Mouth: Mucous membranes are moist.   Eyes:      Extraocular Movements: Extraocular movements intact.      Conjunctiva/sclera: Conjunctivae normal.   Cardiovascular:      Rate and Rhythm: Normal rate and regular rhythm.      Heart sounds: No murmur heard.  Pulmonary:      Effort: Pulmonary effort is normal. No respiratory distress.      Breath sounds: Normal breath sounds. No wheezing, rhonchi or rales.   Abdominal:      General: Abdomen is flat. Bowel sounds are normal. There is no distension.      Palpations: Abdomen is soft.      Tenderness: There is abdominal tenderness (mild) in the suprapubic area. There is no guarding or rebound.   Musculoskeletal:         General: No deformity or signs of injury.      Cervical back: Normal range of motion and neck supple.   Skin:     General: Skin is warm and dry.      Findings: No rash.   Neurological:      Mental Status: She is alert and oriented to person, place, and time.   Psychiatric:         Behavior: Behavior normal.           Diagnostics     Lab Results   Labs Ordered and Resulted from Time of ED Arrival to Time of ED Departure   ROUTINE UA WITH MICROSCOPIC REFLEX TO CULTURE - Abnormal       Result Value    Color Urine Yellow      Appearance Urine Slightly Cloudy (*)     Glucose Urine Negative      Bilirubin Urine Negative      Ketones Urine Trace (*)     Specific Gravity Urine 1.025      Blood Urine Large (*)     pH Urine 6.5      Protein Albumin Urine 30 (*)     Urobilinogen Urine Normal      Nitrite Urine Negative      Leukocyte Esterase Urine Small (*)     Mucus Urine Present (*)     RBC Urine >182 (*)     WBC Urine 8 (*)     Squamous Epithelials Urine 1     BASIC METABOLIC PANEL - Normal    Sodium 137      Potassium 3.7      Chloride 101      Carbon Dioxide (CO2) 23      Anion Gap 13      Urea  Nitrogen 7.0      Creatinine 0.52      GFR Estimate >90      Calcium 8.9      Glucose 97     HCG QUALITATIVE PREGNANCY - Normal    hCG Serum Qualitative Negative     CBC WITH PLATELETS AND DIFFERENTIAL    WBC Count 6.6      RBC Count 4.44      Hemoglobin 12.6      Hematocrit 38.7      MCV 87      MCH 28.4      MCHC 32.6      RDW 13.0      Platelet Count 324      % Neutrophils 77      % Lymphocytes 18      % Monocytes 4      % Eosinophils 0      % Basophils 0      % Immature Granulocytes 1      NRBCs per 100 WBC 0      Absolute Neutrophils 5.1      Absolute Lymphocytes 1.2      Absolute Monocytes 0.3      Absolute Eosinophils 0.0      Absolute Basophils 0.0      Absolute Immature Granulocytes 0.0      Absolute NRBCs 0.0         Imaging   No orders to display     Independent Interpretation   None    ED Course      Medications Administered   Medications   ondansetron (ZOFRAN) injection 4 mg (4 mg Intravenous $Given 1/19/25 1310)   ketorolac (TORADOL) injection 10 mg (10 mg Intravenous $Given 1/19/25 1311)       Procedures   Procedures     Discussion of Management   None    ED Course   ED Course as of 01/19/25 1609   Sun Jan 19, 2025   1302 I obtained history and examined the patient as noted above.     1512 I explained findings to the patient and we discussed plan for discharge. The patient is comfortable with this plan.         Additional Documentation  None    Medical Decision Making / Diagnosis     CMS Diagnoses: None    MIPS       None    MDM   Clemencia Marquez is a 35 year old female who presents with pelvic cramping and vomiting today.  Has had similar in the past.  Differential diagnosis includes dysmenorrhea, cystitis, ovarian cyst, GI illness such as gastroenteritis, etc.  Her abdominal exam is benign.  Workup was pursued as noted.  Her labs are unremarkable.  Her UA does show hematuria but on further questioning, patient notes that she just darted getting her period while in the ED here.  I suspect that her  symptoms are due to dysmenorrhea.  Her symptoms have resolved after IV Toradol and Zofran.  We will plan to discharge home and recommend she take over-the-counter pain medications as needed for her menstrual cramps.  We discussed return precautions.    Disposition   The patient was discharged.     Diagnosis     ICD-10-CM    1. Dysmenorrhea  N94.6            Discharge Medications   Discharge Medication List as of 1/19/2025  3:20 PM            Scribe Disclosure:  IHuey, am serving as a scribe at 1:05 PM on 1/19/2025 to document services personally performed by Gamaliel Mitchell MD based on my observations and the provider's statements to me.     Scribe Disclosure:  I, Mimi Muhammad, am serving as a scribe  for Huey Foley, at 1:12 PM on 1/19/2025 to document services personally performed by Gamaliel Mitchell MD based on my observations and the provider's statements to me.       Gamaliel Mitchell MD  01/19/25 3147

## 2025-01-19 NOTE — ED TRIAGE NOTES
Pt c/o lower abdominal cramping and one episode of vomiting which started today. Denies fevers or diarrhea. ABC's intact, alert and oriented X3.      Triage Assessment (Adult)       Row Name 01/19/25 1212          Triage Assessment    Airway WDL WDL        Respiratory WDL    Respiratory WDL WDL        Skin Circulation/Temperature WDL    Skin Circulation/Temperature WDL WDL        Cardiac WDL    Cardiac WDL WDL        Peripheral/Neurovascular WDL    Peripheral Neurovascular WDL WDL        Cognitive/Neuro/Behavioral WDL    Cognitive/Neuro/Behavioral WDL WDL

## 2025-03-03 ENCOUNTER — OFFICE VISIT (OUTPATIENT)
Dept: INTERNAL MEDICINE | Facility: CLINIC | Age: 35
End: 2025-03-03
Payer: COMMERCIAL

## 2025-03-03 VITALS
WEIGHT: 164.9 LBS | BODY MASS INDEX: 27.47 KG/M2 | TEMPERATURE: 97.7 F | HEIGHT: 65 IN | DIASTOLIC BLOOD PRESSURE: 78 MMHG | HEART RATE: 77 BPM | SYSTOLIC BLOOD PRESSURE: 111 MMHG | OXYGEN SATURATION: 100 % | RESPIRATION RATE: 16 BRPM

## 2025-03-03 DIAGNOSIS — Z51.89 ENCOUNTER FOR WOUND CARE: ICD-10-CM

## 2025-03-03 DIAGNOSIS — S61.217A: Primary | ICD-10-CM

## 2025-03-03 PROCEDURE — 99203 OFFICE O/P NEW LOW 30 MIN: CPT

## 2025-03-03 RX ORDER — MULTIVITAMIN
1 TABLET ORAL DAILY
COMMUNITY
Start: 2023-03-20

## 2025-03-03 RX ORDER — MUPIROCIN 20 MG/G
OINTMENT TOPICAL DAILY
Qty: 15 G | Refills: 0 | Status: SHIPPED | OUTPATIENT
Start: 2025-03-03

## 2025-03-03 ASSESSMENT — PAIN SCALES - GENERAL: PAINLEVEL_OUTOF10: NO PAIN (0)

## 2025-03-03 NOTE — PROGRESS NOTES
"  Assessment & Plan     Cut of skin of left little finger  On 2/25/2025, pt was cutting vegetables and accidentally cut her finger.  Patient endorses increased pain to the tip of her finger.  Pt had a band-aid on it.  Instructed patient to wash the tip of her pinky finger on her left hand and apply mupirocin ointment with a Band-Aid at bedtime.  - mupirocin (BACTROBAN) 2 % external ointment; Apply topically daily.  Instructed patient that if the site becomes more erythematous or develops foul-smelling drainage or becomes warm to the touch to return to be further evaluated  At the tip of her fifth finger on her left hand is a lesion that is 0.3 cm in diameter, site looks clean, no erythema, no warmth to the touch, no foreign bodies embedded in the site.    Encounter for wound care   Sprayed the tip of her fifth finger with antiseptic spray dried with a gauze sponge and applied a bandage      BMI  Estimated body mass index is 27.44 kg/m  as calculated from the following:    Height as of this encounter: 1.651 m (5' 5\").    Weight as of this encounter: 74.8 kg (164 lb 14.4 oz).             Michelle Khanna is a 35 year old, presenting for the following health issues:  Musculoskeletal Problem (Left hand little finger.)      3/3/2025    10:17 AM   Additional Questions   Roomed by Jade Santoyo   Accompanied by self         3/3/2025    10:17 AM   Patient Reported Additional Medications   Patient reports taking the following new medications no     Musculoskeletal Problem    History of Present Illness       Reason for visit:  Cut  Symptom onset:  3-7 days ago   She is taking medications regularly.    On 2/25/2025, pt was cutting vegetables and accidentally cut her finger.  Patient endorses increased pain to the tip of her finger.  Pt had a band-aid on it.  Instructed patient to wash the tip of her pinky finger on her left hand and apply mupirocin ointment with a Band-Aid at bedtime.              Review of " Systems  Constitutional, HEENT, cardiovascular, pulmonary, gi and gu systems are negative, except as otherwise noted.      Objective    LMP 02/12/2025 (Exact Date)   There is no height or weight on file to calculate BMI.  Physical Exam   GENERAL: alert and no distress  NECK: no adenopathy, no asymmetry, masses, or scars  RESP: lungs clear to auscultation - no rales, rhonchi or wheezes  CV: regular rate and rhythm, normal S1 S2, no S3 or S4, no murmur, click or rub, no peripheral edema  ABDOMEN: soft, nontender, no hepatosplenomegaly, no masses and bowel sounds normal  MS: no gross musculoskeletal defects noted, no edema  SKIN: At the tip of her fifth finger on her left hand is a lesion that is 0.3 cm in diameter, site looks clean, no erythema, no warmth to the touch, no foreign bodies embedded in the site.  NEURO: Normal strength and tone, mentation intact and speech normal  PSYCH: mentation appears normal, affect normal/bright            Signed Electronically by: SOTERO Casillas CNP

## 2025-03-03 NOTE — PATIENT INSTRUCTIONS
Suggest you get a tetanus shot at your pharmacy    Apply the bactroban at bedtime with a bandage. If it becomes more red or you develop a fever, or becomes warm to the touch return